# Patient Record
Sex: FEMALE | Race: WHITE | NOT HISPANIC OR LATINO | Employment: STUDENT | URBAN - METROPOLITAN AREA
[De-identification: names, ages, dates, MRNs, and addresses within clinical notes are randomized per-mention and may not be internally consistent; named-entity substitution may affect disease eponyms.]

---
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
## 2025-01-08 ENCOUNTER — APPOINTMENT (OUTPATIENT)
Dept: PRIMARY CARE | Facility: CLINIC | Age: 20
End: 2025-01-08
Payer: COMMERCIAL

## 2025-01-08 VITALS
HEART RATE: 60 BPM | WEIGHT: 168 LBS | HEIGHT: 66 IN | BODY MASS INDEX: 27 KG/M2 | SYSTOLIC BLOOD PRESSURE: 108 MMHG | DIASTOLIC BLOOD PRESSURE: 61 MMHG | OXYGEN SATURATION: 98 %

## 2025-01-08 DIAGNOSIS — G25.2 INTENTION TREMOR: ICD-10-CM

## 2025-01-08 DIAGNOSIS — R63.1 EXCESSIVE THIRST: ICD-10-CM

## 2025-01-08 DIAGNOSIS — R51.9 NONINTRACTABLE HEADACHE, UNSPECIFIED CHRONICITY PATTERN, UNSPECIFIED HEADACHE TYPE: ICD-10-CM

## 2025-01-08 DIAGNOSIS — M62.89 MUSCLE FATIGUE: Primary | ICD-10-CM

## 2025-01-08 DIAGNOSIS — M79.10 MYALGIA: ICD-10-CM

## 2025-01-08 PROCEDURE — 3008F BODY MASS INDEX DOCD: CPT | Performed by: FAMILY MEDICINE

## 2025-01-08 PROCEDURE — 99214 OFFICE O/P EST MOD 30 MIN: CPT | Performed by: FAMILY MEDICINE

## 2025-01-08 RX ORDER — QUETIAPINE FUMARATE 50 MG/1
50 TABLET, FILM COATED ORAL NIGHTLY
COMMUNITY

## 2025-01-08 NOTE — PROGRESS NOTES
"Subjective     Patient ID: [id number]     Teodoro Almaguer is a 19 y.o. female who presents for Dehydration and Ankle Pain.  Grandfather is present to provide some history.    HPI  Feels need to drink more water/fluids.  States her mouth feels dry.  Also feels very physically weak.  Very little activity because easily fatigued.  Feels that she needs to use a cane and frequently to sit/rest.  States this has been her whole life  Chronic constant pain in arms, legs and has headaches daily.  Has constant finger \"shakiness\" - bilateral.      9/2024 labs normal    Anxiety disorder treated by psychiatrist and patient feels it is well controlled      Objective   /61   Pulse 60   Ht 1.676 m (5' 6\")   Wt 76.2 kg (168 lb)   SpO2 98%   BMI 27.12 kg/m²    Physical Exam:   Alert and oriented x 3.  No acute distress.  Clear speech.  All major joints with full range of motion.  Normal muscle strength.  Normal deep tendon reflexes.  Normal hand grasp.      Assessment/Plan   1. Muscle fatigue (Primary)  - Referral to Neurology; Future    2. Excessive thirst  - Referral to Neurology; Future    3. Myalgia  - Referral to Neurology; Future    4. Intention tremor    5. Nonintractable headache, unspecified chronicity pattern, unspecified headache type  - Referral to Neurology; Future    I suspect all symptoms are due to psychiatric issues.  September 2024 labs did not indicate any etiology.  Will refer to neurologist to rule out neurologic conditions.  If neurology assessment is negative, recommended to patient and her grandfather that she seek further psychiatric help.  I am unable to see psychiatrist notes.  I do not know what conditions the psychiatrist is treating the patient for.    Problem List Items Addressed This Visit    None      Arsenio Jeter, DO   "

## 2025-02-06 ENCOUNTER — APPOINTMENT (OUTPATIENT)
Dept: OBSTETRICS AND GYNECOLOGY | Facility: CLINIC | Age: 20
End: 2025-02-06
Payer: COMMERCIAL

## 2025-03-12 ENCOUNTER — APPOINTMENT (OUTPATIENT)
Facility: CLINIC | Age: 20
End: 2025-03-12
Payer: COMMERCIAL

## 2025-03-13 ENCOUNTER — OFFICE VISIT (OUTPATIENT)
Facility: CLINIC | Age: 20
End: 2025-03-13
Payer: COMMERCIAL

## 2025-03-13 VITALS
DIASTOLIC BLOOD PRESSURE: 62 MMHG | BODY MASS INDEX: 26.9 KG/M2 | HEIGHT: 66 IN | SYSTOLIC BLOOD PRESSURE: 102 MMHG | WEIGHT: 167.4 LBS

## 2025-03-13 DIAGNOSIS — F84.0 AUTISM SPECTRUM DISORDER (HHS-HCC): ICD-10-CM

## 2025-03-13 DIAGNOSIS — J45.909 UNCOMPLICATED ASTHMA, UNSPECIFIED ASTHMA SEVERITY, UNSPECIFIED WHETHER PERSISTENT (HHS-HCC): ICD-10-CM

## 2025-03-13 DIAGNOSIS — Q52.4 HYMEN ABNORMALITY: Primary | ICD-10-CM

## 2025-03-13 PROBLEM — Z00.00 ENCOUNTER FOR HEALTH MAINTENANCE EXAMINATION: Status: RESOLVED | Noted: 2024-08-06 | Resolved: 2025-03-13

## 2025-03-13 PROCEDURE — 3008F BODY MASS INDEX DOCD: CPT | Performed by: OBSTETRICS & GYNECOLOGY

## 2025-03-13 PROCEDURE — 99213 OFFICE O/P EST LOW 20 MIN: CPT | Performed by: OBSTETRICS & GYNECOLOGY

## 2025-03-13 RX ORDER — ACETAMINOPHEN 325 MG/1
975 TABLET ORAL ONCE
Status: CANCELLED | OUTPATIENT
Start: 2025-03-13 | End: 2025-03-13

## 2025-03-13 RX ORDER — GABAPENTIN 600 MG/1
600 TABLET ORAL ONCE
Status: CANCELLED | OUTPATIENT
Start: 2025-03-13 | End: 2025-03-13

## 2025-03-13 RX ORDER — CELECOXIB 400 MG/1
400 CAPSULE ORAL ONCE
Status: CANCELLED | OUTPATIENT
Start: 2025-03-13 | End: 2025-03-13

## 2025-03-13 NOTE — H&P (VIEW-ONLY)
"Subjective   Patient ID: Teodoro Almaguer is a 20 y.o. female who presents for Follow-up. Was seen 24 for problems with her hymen and inserting a tampon.  HPI   not sexually active . Partner is trans male in [place]   Has noticed a hymenal band \" since I was 13\" . Recently using tampons for menses and feels this band getting in the way .       Review of Systems  Neg   Objective   Physical Exam  Physical Exam  Constitutional:       Appearance: Normal appearance.     Abdominal:    Abdomen is flat. Soft with no masses, non tender       Genitourinary:     Labia:  No lesions  and No rash.       Urethra: No urethral lesion.      Bladder: no prolapse     Vagina: Normal mucosa, pink and no discharge. Hymen : small thin mucosal band 1 cm wide by 3 cm long  dividing the hymen in two .      Cervix: Normal. Small w no lesions      Uterus:    Small. Non tender. No masses.      Adnexa:  Bilaterally with no mass or tenderness.                  Assessment/Plan   Diagnoses and all orders for this visit:  Hymen abnormality  Autism    Pt declines office procedure for removal of hymenal band. Wants this under anesthesia.  Her legal guardian is her Grandfather Frank Almaguer [phone] . He is here with her and we discussed pre and post op care. Surgery to be outpatient.    Will schedule .     MD Nora Alexis, [facility] 25 1:55 PM     I agree with this note. This patient was seen and examined by me. today Additions/ corrections made in text by me.     Cecilia Eng MD    "

## 2025-03-13 NOTE — PROGRESS NOTES
"Subjective   Patient ID: Teodoro Almaguer is a 20 y.o. female who presents for Follow-up. Was seen 24 for problems with her hymen and inserting a tampon.  HPI   not sexually active . Partner is trans male in [place]   Has noticed a hymenal band \" since I was 13\" . Recently using tampons for menses and feels this band getting in the way .       Review of Systems  Neg   Objective   Physical Exam  Physical Exam  Constitutional:       Appearance: Normal appearance.     Abdominal:    Abdomen is flat. Soft with no masses, non tender       Genitourinary:     Labia:  No lesions  and No rash.       Urethra: No urethral lesion.      Bladder: no prolapse     Vagina: Normal mucosa, pink and no discharge. Hymen : small thin mucosal band 1 cm wide by 3 cm long  dividing the hymen in two .      Cervix: Normal. Small w no lesions      Uterus:    Small. Non tender. No masses.      Adnexa:  Bilaterally with no mass or tenderness.                  Assessment/Plan   Diagnoses and all orders for this visit:  Hymen abnormality  Autism    Pt declines office procedure for removal of hymenal band. Wants this under anesthesia.  Her legal guardian is her Grandfather Frank Almaguer [phone] . He is here with her and we discussed pre and post op care. Surgery to be outpatient.    Will schedule .     MD Nora Alexis, [facility] 25 1:55 PM   "

## 2025-03-21 ENCOUNTER — OFFICE VISIT (OUTPATIENT)
Dept: PRIMARY CARE | Facility: CLINIC | Age: 20
End: 2025-03-21
Payer: COMMERCIAL

## 2025-03-21 VITALS
SYSTOLIC BLOOD PRESSURE: 106 MMHG | OXYGEN SATURATION: 98 % | HEIGHT: 66 IN | BODY MASS INDEX: 27.16 KG/M2 | WEIGHT: 169 LBS | DIASTOLIC BLOOD PRESSURE: 66 MMHG | HEART RATE: 93 BPM

## 2025-03-21 DIAGNOSIS — F31.62 BIPOLAR DISORDER, CURRENT EPISODE MIXED, MODERATE (MULTI): ICD-10-CM

## 2025-03-21 DIAGNOSIS — F84.0 AUTISM SPECTRUM DISORDER (HHS-HCC): ICD-10-CM

## 2025-03-21 DIAGNOSIS — Q52.4 HYMEN ABNORMALITY: Primary | ICD-10-CM

## 2025-03-21 DIAGNOSIS — Z01.818 PREOP EXAMINATION: ICD-10-CM

## 2025-03-21 PROCEDURE — 3008F BODY MASS INDEX DOCD: CPT | Performed by: FAMILY MEDICINE

## 2025-03-21 PROCEDURE — 99214 OFFICE O/P EST MOD 30 MIN: CPT | Performed by: FAMILY MEDICINE

## 2025-03-21 PROCEDURE — 1036F TOBACCO NON-USER: CPT | Performed by: FAMILY MEDICINE

## 2025-03-21 NOTE — PROGRESS NOTES
Medical Pre Operative Evaluation    Date of Surgery:  4/8/25  Procedure:  EXCISION, LESION, VAGINA [14992 (CPT®)]   Surgeon:  Cecilia Eng MD   Hospital:  [facility]  Anesthesia:  not specified      HPI  Diagnosis   Hymen abnormality   Evaluated by Dr. Eng.  Currently denies URI symptoms, abdominal pain, nausea, vomiting or diarrhea.  Remote history of asthma.  Currently not being treated because she is asymptomatic.  Father is present for today's evaluation because patient does not drive due to autism spectrum disorder.  She is being treated for bipolar disorder by a psychiatrist.          Past Medical History:   Diagnosis Date    Anxiety     Anxiety disorder 08/06/2024    Autism spectrum disorder ([facility]) 03/13/2025    Bipolar disorder, current episode mixed, moderate (Multi) 03/21/2025    Influenza due to other identified influenza virus with other respiratory manifestations 02/25/2020    Type B influenza    Pica, in children 08/02/2016    History of pica    Scabies 06/18/2018    History of pediculosis       Past Surgical History:   Procedure Laterality Date    OTHER SURGICAL HISTORY  11/04/2019    No history of surgery       Social History     Socioeconomic History    Marital status: Single     Spouse name: Not on file    Number of children: Not on file    Years of education: Not on file    Highest education level: Not on file   Occupational History    Not on file   Tobacco Use    Smoking status: Never    Smokeless tobacco: Never   Vaping Use    Vaping status: Never Used   Substance and Sexual Activity    Alcohol use: Never    Drug use: Never    Sexual activity: Not Currently   Other Topics Concern    Not on file   Social History Narrative    Not on file     Social Drivers of Health     Financial Resource Strain: Not on file   Food Insecurity: Not on file   Transportation Needs: Not on file   Physical Activity: Not on file   Stress: Not on file   Social Connections: Not on file  "  Intimate Partner Violence: Not on file   Housing Stability: Not on file       Current Outpatient Medications on File Prior to Visit   Medication Sig Dispense Refill    cetirizine (ZyrTEC) 10 mg tablet Take 1 tablet (10 mg) by mouth early in the morning..      FLUoxetine (PROzac) 40 mg capsule Take 1 capsule (40 mg) by mouth once daily.      QUEtiapine (SEROquel) 50 mg tablet Take 1 tablet (50 mg) by mouth once daily at bedtime.       No current facility-administered medications on file prior to visit.       Allergies   Allergen Reactions    Milk Hives, Dermatitis and Nausea/vomiting     Confirmed with testing       Visit Vitals  /66   Pulse 93   Ht 1.676 m (5' 6\")   Wt 76.7 kg (169 lb)   LMP  (LMP Unknown)   SpO2 98%   BMI 27.28 kg/m²   OB Status Having periods   Smoking Status Never   BSA 1.89 m²       Alert and oriented x3.  Patient can correctly identify day of week, month, year, location.  She is a fairly high functioning autistic.  Eyes: EOM grossly intact  Neck supple without lymph adenopathy or carotid bruit.  No masses or thyromegaly  Heart regular rate and rhythm without murmur.  Lungs clear to auscultation.  Legs without edema.  Gait is non-antalgic  Speech clear.  Hearing adequate.    ASSESSMENT / DIAGNOSIS  Problem List Items Addressed This Visit       Autism spectrum disorder ([facility])    Hymen abnormality - Primary    Bipolar disorder, current episode mixed, moderate (Multi)         PLAN  Patient is medically stable and an acceptable risk for the proposed procedure pending review of pre-op lab.    All over the counter NSAIDS (aspirin, Bufferin, Excedrin, ibuprofen, Motrin, Advil, naproxsyn, Aleve, etc) and any prescription NSAIDS should be stopped 7 days prior to surgery unless instructed differently by the surgeon.        Follow up in my office as scheduled for routine medical exam    Arsenio Jeter DO, FACOFP   "

## 2025-03-22 LAB
BASOPHILS # BLD AUTO: 50 CELLS/UL (ref 0–200)
BASOPHILS NFR BLD AUTO: 0.9 %
EOSINOPHIL # BLD AUTO: 319 CELLS/UL (ref 15–500)
EOSINOPHIL NFR BLD AUTO: 5.8 %
ERYTHROCYTE [DISTWIDTH] IN BLOOD BY AUTOMATED COUNT: 13 % (ref 11–15)
HCT VFR BLD AUTO: 37.7 % (ref 35–45)
HGB BLD-MCNC: 12 G/DL (ref 11.7–15.5)
LYMPHOCYTES # BLD AUTO: 2316 CELLS/UL (ref 850–3900)
LYMPHOCYTES NFR BLD AUTO: 42.1 %
MCH RBC QN AUTO: 28 PG (ref 27–33)
MCHC RBC AUTO-ENTMCNC: 31.8 G/DL (ref 32–36)
MCV RBC AUTO: 87.9 FL (ref 80–100)
MONOCYTES # BLD AUTO: 473 CELLS/UL (ref 200–950)
MONOCYTES NFR BLD AUTO: 8.6 %
NEUTROPHILS # BLD AUTO: 2343 CELLS/UL (ref 1500–7800)
NEUTROPHILS NFR BLD AUTO: 42.6 %
PLATELET # BLD AUTO: 219 THOUSAND/UL (ref 140–400)
PMV BLD REES-ECKER: 11.8 FL (ref 7.5–12.5)
RBC # BLD AUTO: 4.29 MILLION/UL (ref 3.8–5.1)
WBC # BLD AUTO: 5.5 THOUSAND/UL (ref 3.8–10.8)

## 2025-04-08 ENCOUNTER — ANESTHESIA EVENT (OUTPATIENT)
Dept: OPERATING ROOM | Facility: HOSPITAL | Age: 20
End: 2025-04-08
Payer: COMMERCIAL

## 2025-04-08 ENCOUNTER — HOSPITAL ENCOUNTER (OUTPATIENT)
Facility: HOSPITAL | Age: 20
Setting detail: OUTPATIENT SURGERY
Discharge: HOME | End: 2025-04-08
Attending: OBSTETRICS & GYNECOLOGY | Admitting: OBSTETRICS & GYNECOLOGY
Payer: COMMERCIAL

## 2025-04-08 ENCOUNTER — ANESTHESIA (OUTPATIENT)
Dept: OPERATING ROOM | Facility: HOSPITAL | Age: 20
End: 2025-04-08
Payer: COMMERCIAL

## 2025-04-08 VITALS
WEIGHT: 169 LBS | DIASTOLIC BLOOD PRESSURE: 58 MMHG | HEIGHT: 67 IN | OXYGEN SATURATION: 96 % | SYSTOLIC BLOOD PRESSURE: 101 MMHG | RESPIRATION RATE: 16 BRPM | TEMPERATURE: 97.3 F | HEART RATE: 103 BPM | BODY MASS INDEX: 26.53 KG/M2

## 2025-04-08 DIAGNOSIS — Q52.4 HYMEN ABNORMALITY: Primary | ICD-10-CM

## 2025-04-08 DIAGNOSIS — G89.18 POST-OP PAIN: ICD-10-CM

## 2025-04-08 LAB — HCG UR QL IA.RAPID: NEGATIVE

## 2025-04-08 PROCEDURE — 56700 PRTL HYMNCTMY/REVJ HYMNL RNG: CPT | Performed by: OBSTETRICS & GYNECOLOGY

## 2025-04-08 PROCEDURE — 3600000003 HC OR TIME - INITIAL BASE CHARGE - PROCEDURE LEVEL THREE: Performed by: OBSTETRICS & GYNECOLOGY

## 2025-04-08 PROCEDURE — 7100000009 HC PHASE TWO TIME - INITIAL BASE CHARGE: Performed by: OBSTETRICS & GYNECOLOGY

## 2025-04-08 PROCEDURE — 2500000005 HC RX 250 GENERAL PHARMACY W/O HCPCS: Performed by: STUDENT IN AN ORGANIZED HEALTH CARE EDUCATION/TRAINING PROGRAM

## 2025-04-08 PROCEDURE — 2500000004 HC RX 250 GENERAL PHARMACY W/ HCPCS (ALT 636 FOR OP/ED): Performed by: NURSE ANESTHETIST, CERTIFIED REGISTERED

## 2025-04-08 PROCEDURE — 7100000010 HC PHASE TWO TIME - EACH INCREMENTAL 1 MINUTE: Performed by: OBSTETRICS & GYNECOLOGY

## 2025-04-08 PROCEDURE — 7100000002 HC RECOVERY ROOM TIME - EACH INCREMENTAL 1 MINUTE: Performed by: OBSTETRICS & GYNECOLOGY

## 2025-04-08 PROCEDURE — 3700000002 HC GENERAL ANESTHESIA TIME - EACH INCREMENTAL 1 MINUTE: Performed by: OBSTETRICS & GYNECOLOGY

## 2025-04-08 PROCEDURE — 3700000001 HC GENERAL ANESTHESIA TIME - INITIAL BASE CHARGE: Performed by: OBSTETRICS & GYNECOLOGY

## 2025-04-08 PROCEDURE — 3600000008 HC OR TIME - EACH INCREMENTAL 1 MINUTE - PROCEDURE LEVEL THREE: Performed by: OBSTETRICS & GYNECOLOGY

## 2025-04-08 PROCEDURE — 7100000001 HC RECOVERY ROOM TIME - INITIAL BASE CHARGE: Performed by: OBSTETRICS & GYNECOLOGY

## 2025-04-08 PROCEDURE — 81025 URINE PREGNANCY TEST: CPT | Performed by: OBSTETRICS & GYNECOLOGY

## 2025-04-08 PROCEDURE — 2500000004 HC RX 250 GENERAL PHARMACY W/ HCPCS (ALT 636 FOR OP/ED): Performed by: OBSTETRICS & GYNECOLOGY

## 2025-04-08 PROCEDURE — 2500000005 HC RX 250 GENERAL PHARMACY W/O HCPCS

## 2025-04-08 PROCEDURE — 2500000004 HC RX 250 GENERAL PHARMACY W/ HCPCS (ALT 636 FOR OP/ED)

## 2025-04-08 PROCEDURE — 2500000005 HC RX 250 GENERAL PHARMACY W/O HCPCS: Performed by: OBSTETRICS & GYNECOLOGY

## 2025-04-08 RX ORDER — ONDANSETRON HYDROCHLORIDE 2 MG/ML
4 INJECTION, SOLUTION INTRAVENOUS ONCE AS NEEDED
Status: DISCONTINUED | OUTPATIENT
Start: 2025-04-08 | End: 2025-04-08 | Stop reason: HOSPADM

## 2025-04-08 RX ORDER — ALBUTEROL SULFATE 0.83 MG/ML
2.5 SOLUTION RESPIRATORY (INHALATION) ONCE AS NEEDED
Status: DISCONTINUED | OUTPATIENT
Start: 2025-04-08 | End: 2025-04-08 | Stop reason: HOSPADM

## 2025-04-08 RX ORDER — ONDANSETRON HYDROCHLORIDE 2 MG/ML
INJECTION, SOLUTION INTRAVENOUS AS NEEDED
Status: DISCONTINUED | OUTPATIENT
Start: 2025-04-08 | End: 2025-04-08

## 2025-04-08 RX ORDER — FENTANYL CITRATE 50 UG/ML
INJECTION, SOLUTION INTRAMUSCULAR; INTRAVENOUS AS NEEDED
Status: DISCONTINUED | OUTPATIENT
Start: 2025-04-08 | End: 2025-04-08

## 2025-04-08 RX ORDER — OXYCODONE HYDROCHLORIDE 5 MG/1
5 TABLET ORAL EVERY 4 HOURS PRN
Status: DISCONTINUED | OUTPATIENT
Start: 2025-04-08 | End: 2025-04-08 | Stop reason: HOSPADM

## 2025-04-08 RX ORDER — HYDROMORPHONE HYDROCHLORIDE 0.2 MG/ML
0.2 INJECTION INTRAMUSCULAR; INTRAVENOUS; SUBCUTANEOUS
Status: DISCONTINUED | OUTPATIENT
Start: 2025-04-08 | End: 2025-04-08 | Stop reason: HOSPADM

## 2025-04-08 RX ORDER — SODIUM CHLORIDE, SODIUM LACTATE, POTASSIUM CHLORIDE, CALCIUM CHLORIDE 600; 310; 30; 20 MG/100ML; MG/100ML; MG/100ML; MG/100ML
100 INJECTION, SOLUTION INTRAVENOUS CONTINUOUS
Status: ACTIVE | OUTPATIENT
Start: 2025-04-08 | End: 2025-04-08

## 2025-04-08 RX ORDER — SODIUM CHLORIDE, SODIUM LACTATE, POTASSIUM CHLORIDE, CALCIUM CHLORIDE 600; 310; 30; 20 MG/100ML; MG/100ML; MG/100ML; MG/100ML
INJECTION, SOLUTION INTRAVENOUS CONTINUOUS PRN
Status: DISCONTINUED | OUTPATIENT
Start: 2025-04-08 | End: 2025-04-08

## 2025-04-08 RX ORDER — IBUPROFEN 600 MG/1
600 TABLET ORAL EVERY 6 HOURS PRN
Qty: 10 TABLET | Refills: 1 | Status: SHIPPED | OUTPATIENT
Start: 2025-04-08

## 2025-04-08 RX ORDER — FENTANYL CITRATE 50 UG/ML
25 INJECTION, SOLUTION INTRAMUSCULAR; INTRAVENOUS EVERY 5 MIN PRN
Status: DISCONTINUED | OUTPATIENT
Start: 2025-04-08 | End: 2025-04-08 | Stop reason: HOSPADM

## 2025-04-08 RX ORDER — LABETALOL HYDROCHLORIDE 5 MG/ML
5 INJECTION, SOLUTION INTRAVENOUS ONCE AS NEEDED
Status: DISCONTINUED | OUTPATIENT
Start: 2025-04-08 | End: 2025-04-08 | Stop reason: HOSPADM

## 2025-04-08 RX ORDER — PROPOFOL 10 MG/ML
INJECTION, EMULSION INTRAVENOUS AS NEEDED
Status: DISCONTINUED | OUTPATIENT
Start: 2025-04-08 | End: 2025-04-08

## 2025-04-08 RX ORDER — MEPERIDINE HYDROCHLORIDE 50 MG/ML
12.5 INJECTION INTRAMUSCULAR; INTRAVENOUS; SUBCUTANEOUS EVERY 10 MIN PRN
Status: DISCONTINUED | OUTPATIENT
Start: 2025-04-08 | End: 2025-04-08 | Stop reason: HOSPADM

## 2025-04-08 RX ORDER — PHENYLEPHRINE HCL IN 0.9% NACL 1 MG/10 ML
SYRINGE (ML) INTRAVENOUS AS NEEDED
Status: DISCONTINUED | OUTPATIENT
Start: 2025-04-08 | End: 2025-04-08

## 2025-04-08 RX ORDER — LIDOCAINE HYDROCHLORIDE 20 MG/ML
INJECTION, SOLUTION EPIDURAL; INFILTRATION; INTRACAUDAL; PERINEURAL AS NEEDED
Status: DISCONTINUED | OUTPATIENT
Start: 2025-04-08 | End: 2025-04-08

## 2025-04-08 RX ORDER — MIDAZOLAM HYDROCHLORIDE 1 MG/ML
INJECTION, SOLUTION INTRAMUSCULAR; INTRAVENOUS AS NEEDED
Status: DISCONTINUED | OUTPATIENT
Start: 2025-04-08 | End: 2025-04-08

## 2025-04-08 RX ORDER — MIDAZOLAM HYDROCHLORIDE 1 MG/ML
1 INJECTION, SOLUTION INTRAMUSCULAR; INTRAVENOUS ONCE AS NEEDED
Status: DISCONTINUED | OUTPATIENT
Start: 2025-04-08 | End: 2025-04-08 | Stop reason: HOSPADM

## 2025-04-08 RX ADMIN — MIDAZOLAM 2 MG: 1 INJECTION INTRAMUSCULAR; INTRAVENOUS at 10:48

## 2025-04-08 RX ADMIN — Medication 150 MCG: at 11:14

## 2025-04-08 RX ADMIN — Medication 200 MCG: at 11:02

## 2025-04-08 RX ADMIN — PROPOFOL 150 MG: 10 INJECTION, EMULSION INTRAVENOUS at 10:54

## 2025-04-08 RX ADMIN — FENTANYL CITRATE 50 MCG: 50 INJECTION, SOLUTION INTRAMUSCULAR; INTRAVENOUS at 10:59

## 2025-04-08 RX ADMIN — LIDOCAINE HYDROCHLORIDE 100 MG: 20 INJECTION, SOLUTION EPIDURAL; INFILTRATION; INTRACAUDAL; PERINEURAL at 10:54

## 2025-04-08 RX ADMIN — SODIUM CHLORIDE, POTASSIUM CHLORIDE, SODIUM LACTATE AND CALCIUM CHLORIDE: 600; 310; 30; 20 INJECTION, SOLUTION INTRAVENOUS at 10:48

## 2025-04-08 RX ADMIN — WHITE PETROLATUM 57.7 %-MINERAL OIL 31.9 % EYE OINTMENT 1 APPLICATION: at 11:12

## 2025-04-08 RX ADMIN — Medication 100 MCG: at 11:18

## 2025-04-08 RX ADMIN — Medication 6 L/MIN: at 11:34

## 2025-04-08 RX ADMIN — Medication 100 MCG: at 11:00

## 2025-04-08 RX ADMIN — ONDANSETRON 4 MG: 2 INJECTION INTRAMUSCULAR; INTRAVENOUS at 11:08

## 2025-04-08 RX ADMIN — DEXAMETHASONE SODIUM PHOSPHATE 8 MG: 4 INJECTION INTRA-ARTICULAR; INTRALESIONAL; INTRAMUSCULAR; INTRAVENOUS; SOFT TISSUE at 11:06

## 2025-04-08 RX ADMIN — FENTANYL CITRATE 50 MCG: 50 INJECTION, SOLUTION INTRAMUSCULAR; INTRAVENOUS at 10:54

## 2025-04-08 RX ADMIN — PROPOFOL 50 MG: 10 INJECTION, EMULSION INTRAVENOUS at 10:56

## 2025-04-08 SDOH — HEALTH STABILITY: MENTAL HEALTH: CURRENT SMOKER: 0

## 2025-04-08 ASSESSMENT — PAIN SCALES - GENERAL
PAINLEVEL_OUTOF10: 1
PAINLEVEL_OUTOF10: 0 - NO PAIN
PAINLEVEL_OUTOF10: 1
PAINLEVEL_OUTOF10: 0 - NO PAIN
PAIN_LEVEL: 0
PAINLEVEL_OUTOF10: 0 - NO PAIN
PAINLEVEL_OUTOF10: 1
PAINLEVEL_OUTOF10: 1

## 2025-04-08 ASSESSMENT — PAIN - FUNCTIONAL ASSESSMENT
PAIN_FUNCTIONAL_ASSESSMENT: 0-10
PAIN_FUNCTIONAL_ASSESSMENT: UNABLE TO SELF-REPORT
PAIN_FUNCTIONAL_ASSESSMENT: 0-10
PAIN_FUNCTIONAL_ASSESSMENT: UNABLE TO SELF-REPORT

## 2025-04-08 NOTE — ANESTHESIA POSTPROCEDURE EVALUATION
Patient: Teodoro Almaguer    Procedure Summary       Date: 04/08/25 Room / Location: [facility] OR  / Virtual [facility] OR    Anesthesia Start: 1049 Anesthesia Stop: 1136    Procedure: EXCISION, LESION, VAGINA Diagnosis:       Hymen abnormality      (Hymen abnormality [Q52.4])    Surgeons: Cecilia Eng MD Responsible Provider: Ezio Cox DO    Anesthesia Type: general ASA Status: Not recorded            Anesthesia Type: general    Vitals Value Taken Time   BP 91/52 04/08/25 1134   Temp 36 04/08/25 1138   Pulse 64 04/08/25 1137   Resp 17 04/08/25 1137   SpO2 97 % 04/08/25 1137   Vitals shown include unfiled device data.    Anesthesia Post Evaluation    Patient location during evaluation: bedside  Patient participation: complete - patient participated  Level of consciousness: awake  Pain score: 0  Pain management: adequate  Airway patency: patent  Cardiovascular status: acceptable  Respiratory status: acceptable and face mask  Hydration status: acceptable  Postoperative Nausea and Vomiting: none        There were no known notable events for this encounter.

## 2025-04-08 NOTE — ANESTHESIA PREPROCEDURE EVALUATION
Patient: Teodoro Almaguer    Procedure Information       Anesthesia Start Date/Time: 04/08/25 1049    Procedure: EXCISION, LESION, VAGINA - 30 min procedure or less    Location: [facility] OR 10 / [facility] OR    Surgeons: Cecilia Eng MD            Relevant Problems   Pulmonary   (+) Asthma, chronic, mild persistent, uncomplicated ([facility])      Neuro   (+) Anxiety disorder   (+) Autism spectrum disorder ([facility])   (+) Bipolar disorder, current episode mixed, moderate (Multi)       Clinical information reviewed:   Tobacco  Allergies  Meds   Med Hx  Surg Hx   Fam Hx  Soc Hx        NPO Detail:  NPO/Void Status  Date of Last Liquid: 04/07/25  Time of Last Liquid: 2355  Date of Last Solid: 04/07/25  Time of Last Solid: 2330  Time of Last Void: 1040         Physical Exam    Airway  Mallampati: I  TM distance: >3 FB     Cardiovascular - normal exam  Rhythm: regular  Rate: normal     Dental - normal exam     Pulmonary - normal exam     Abdominal - normal exam  Abdomen: soft             Anesthesia Plan    History of general anesthesia?: yes  History of complications of general anesthesia?: no    ASA 1     general     The patient is not a current smoker.  Patient was previously instructed to abstain from smoking on day of procedure.  Patient did not smoke on day of procedure.  Education provided regarding risk of obstructive sleep apnea.  intravenous induction   Postoperative administration of opioids is intended.  Anesthetic plan and risks discussed with patient.  Use of blood products discussed with patient who.

## 2025-04-08 NOTE — ANESTHESIA PROCEDURE NOTES
Airway  Date/Time: 4/8/2025 10:57 AM  Urgency: elective      Staffing  Performed: EVAN   Authorized by: Ezio Cox DO    Performed by: Matthieu Meade RN  Patient location during procedure: OR    Indications and Patient Condition  Indications for airway management: anesthesia  Spontaneous Ventilation: absent  Sedation level: deep  Preoxygenated: yes  Patient position: sniffing  MILS maintained throughout  Mask difficulty assessment: 1 - vent by mask    Final Airway Details  Final airway type: supraglottic airway      Successful airway: classic  Size 4     Number of attempts at approach: 1  Ventilation between attempts: BVM

## 2025-04-09 NOTE — OP NOTE
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.4] Post-op Diagnosis     * Hymen abnormality [Q52.4]     Procedures  EXCISION, LESION, VAGINA/ HYMENAL BAND       Surgeons      * Cecilia Eng - Primary    Resident/Fellow/Other Assistant:    Staff:   Carisaulator: Tanna Hemphill Person: Anita Hemphill Person: Eli  Surgical Assistant: Heather    Anesthesia Staff: Anesthesiologist: Ezio Cox DO  CRNA: Caitlyn Fuentes APRN-CRNA  SRNA: Matthieu Meade RN    Procedure Summary  Anesthesia: General  ASA: I  Estimated Blood Loss: 10 mL  Intra-op Medications:   Administrations occurring from 1120 to 1340 on 25:   Medication Name Total Dose   gelatin sponge,absorb-porcine (Gelfoam) sponge 1 each   LR infusion Cannot be calculated   oxygen (O2) therapy 246 L              Anesthesia Record               Intraprocedure I/O Totals          Intake    LR infusion 400.00 mL    Total Intake 400 mL       Output    Est. Blood Loss 0 mL    Total Output 0 mL       Net    Net Volume 400 mL          Specimen: No specimens collected        Procedure Details:  The patient was seen in the preoperative area. The site of surgery was properly noted/marked if necessary per policy. The patient has been actively warmed in preoperative area. Preoperative antibiotics are not indicated. Venous thrombosis prophylaxis have been ordered including bilateral sequential compression devices    Findings: 2 cm long hymenal band from 11:00 to 6;00 of vagina       This is a 20-year-old female  who was seen in the office with difficulty wearing tampons during her menstrual period due to a vaginal band of tissue.  This was relatively thick and would require suturing with removal and patient chose to do this under general.  Anesthesia.  After general anesthesia was administered she was placed in dorsal lithotomy  position.  The vaginal area was prepped with chlorhexidine.    The band of tissue was injected with 1% lidocaine with epinephrine approximately 4 cc total.  Hemostats were used to isolate the band and excise at both ends.  The pedicles were sutured with 3-0 Vicryl suture.  At the end of the procedure the vagina was inspected and appeared normal.  There was a little bit of oozing that occurred between the sutures.  And a small Gelfoam square was inserted in the lower vagina.    The patient was awakened from her general anesthesia sponge lap needle and instrument counts were correct and she was brought to recovery room in stable condition          Complications:  None; patient tolerated the procedure well.     Disposition: PACU - hemodynamically stable.  Condition: stable    Cecilia Eng MD

## 2025-04-23 ENCOUNTER — APPOINTMENT (OUTPATIENT)
Facility: CLINIC | Age: 20
End: 2025-04-23
Payer: COMMERCIAL

## 2025-04-23 VITALS
DIASTOLIC BLOOD PRESSURE: 70 MMHG | HEIGHT: 67 IN | BODY MASS INDEX: 26.46 KG/M2 | SYSTOLIC BLOOD PRESSURE: 105 MMHG | WEIGHT: 168.6 LBS

## 2025-04-23 DIAGNOSIS — Z09 POSTOP CHECK: Primary | ICD-10-CM

## 2025-04-23 PROCEDURE — 99213 OFFICE O/P EST LOW 20 MIN: CPT | Performed by: OBSTETRICS & GYNECOLOGY

## 2025-04-23 PROCEDURE — 3008F BODY MASS INDEX DOCD: CPT | Performed by: OBSTETRICS & GYNECOLOGY

## 2025-04-23 NOTE — PROGRESS NOTES
Subjective   Patient ID: Teodoro Almaguer is a 20 y.o. female who presents for Post-op. 4/8/25 Hymenal band excision.     HPI  Doing well post op. Minimal pain . On her menses now.   Wears pads/ disposable underwear for menses   Review of Systems  Neg   Objective   Physical Exam  Hymenal band excised. No definite sutures seen.   Unable to tolerate small speculum exam due to pain.    Single digit exam palpates no band , no mass   Assessment/Plan   Diagnoses and all orders for this visit:  Postop check     Pt advised she can try tampons now. Call or return w any issues.    MD Nora Alexis, JOHN 04/23/25 8:25 AM

## 2025-06-26 ENCOUNTER — OFFICE VISIT (OUTPATIENT)
Dept: NEUROLOGY | Facility: CLINIC | Age: 20
End: 2025-06-26
Payer: MEDICAID

## 2025-06-26 VITALS
DIASTOLIC BLOOD PRESSURE: 70 MMHG | BODY MASS INDEX: 27.62 KG/M2 | WEIGHT: 176 LBS | HEART RATE: 91 BPM | SYSTOLIC BLOOD PRESSURE: 99 MMHG | HEIGHT: 67 IN

## 2025-06-26 DIAGNOSIS — R26.9 ABNORMAL GAIT: ICD-10-CM

## 2025-06-26 DIAGNOSIS — R53.1 GENERALIZED WEAKNESS: Primary | ICD-10-CM

## 2025-06-26 DIAGNOSIS — M79.10 MYALGIA: ICD-10-CM

## 2025-06-26 PROCEDURE — 3008F BODY MASS INDEX DOCD: CPT | Performed by: NURSE PRACTITIONER

## 2025-06-26 PROCEDURE — 99204 OFFICE O/P NEW MOD 45 MIN: CPT | Performed by: NURSE PRACTITIONER

## 2025-06-26 PROCEDURE — 99214 OFFICE O/P EST MOD 30 MIN: CPT | Performed by: NURSE PRACTITIONER

## 2025-06-26 NOTE — PROGRESS NOTES
Patient being assessed today for initial evaluation of myalgia, gait disturbance, generalized weakness.  Patient is accompanied by her grandfather who provides information as well during this evaluation.  Patient reports that she was recently at her PCPs office for an annual physical and was informed that she needed to be evaluated by neurology because of the myalgia and generalized weakness.  They report that since they can remember patient has struggled with weakness throughout her body as well as aches throughout her body.  She does ambulate with a cane.  She states that with standing for too long periods of time or ambulating for too long of distances she feels like her knees become weak and will buckle.  Denies falls.  Suffers from fatigue and poor endurance.  Denies numbness and tingling.  Denies dexterity issues.  Muscle tone and bulk equal bilaterally.  Generalized weakness assessed in all 4 extremities at 3+.  Labs ordered to rule out any reversible cause, these include folate, B12, CRP, sed rate.  Would also like to get an MRI of the brain and cervical spine to rule out MS or other organic cause for her symptoms.  This is a complex case.  Follow-up will be pending the results of the imaging and lab tests.    This note was created with voice recognition software and was not corrected for typographical or grammatical errors

## 2025-07-15 ENCOUNTER — LAB (OUTPATIENT)
Dept: LAB | Facility: HOSPITAL | Age: 20
End: 2025-07-15
Payer: MEDICAID

## 2025-07-15 LAB
ERYTHROCYTE [DISTWIDTH] IN BLOOD BY AUTOMATED COUNT: 13.5 % (ref 11.5–14.5)
HCT VFR BLD AUTO: 41 % (ref 36–46)
HGB BLD-MCNC: 13.1 G/DL (ref 12–16)
MCH RBC QN AUTO: 28.2 PG (ref 26–34)
MCHC RBC AUTO-ENTMCNC: 32 G/DL (ref 32–36)
MCV RBC AUTO: 88 FL (ref 80–100)
NRBC BLD-RTO: 0 /100 WBCS (ref 0–0)
PLATELET # BLD AUTO: 224 X10*3/UL (ref 150–450)
RBC # BLD AUTO: 4.65 X10*6/UL (ref 4–5.2)
WBC # BLD AUTO: 5.1 X10*3/UL (ref 4.4–11.3)

## 2025-07-15 PROCEDURE — 85027 COMPLETE CBC AUTOMATED: CPT

## 2025-07-16 LAB
CRP SERPL-MCNC: <3 MG/L
ERYTHROCYTE [SEDIMENTATION RATE] IN BLOOD BY WESTERGREN METHOD: 2 MM/H
FOLATE SERPL-MCNC: >24 NG/ML
VIT B12 SERPL-MCNC: 778 PG/ML (ref 200–1100)

## 2025-07-18 ENCOUNTER — HOSPITAL ENCOUNTER (OUTPATIENT)
Dept: RADIOLOGY | Facility: CLINIC | Age: 20
Discharge: HOME | End: 2025-07-18
Payer: MEDICAID

## 2025-07-18 DIAGNOSIS — R26.9 ABNORMAL GAIT: ICD-10-CM

## 2025-07-18 DIAGNOSIS — R53.1 GENERALIZED WEAKNESS: ICD-10-CM

## 2025-07-18 PROCEDURE — 72156 MRI NECK SPINE W/O & W/DYE: CPT

## 2025-07-18 PROCEDURE — 2550000001 HC RX 255 CONTRASTS: Performed by: NURSE PRACTITIONER

## 2025-07-18 PROCEDURE — A9575 INJ GADOTERATE MEGLUMI 0.1ML: HCPCS | Performed by: NURSE PRACTITIONER

## 2025-07-18 PROCEDURE — 70553 MRI BRAIN STEM W/O & W/DYE: CPT

## 2025-07-18 RX ORDER — GADOTERATE MEGLUMINE 376.9 MG/ML
0.2 INJECTION INTRAVENOUS
Status: COMPLETED | OUTPATIENT
Start: 2025-07-18 | End: 2025-07-18

## 2025-07-18 RX ADMIN — GADOTERATE MEGLUMINE 16 ML: 376.9 INJECTION INTRAVENOUS at 09:19

## 2025-08-04 DIAGNOSIS — M79.10 MYALGIA: Primary | ICD-10-CM

## 2025-08-04 DIAGNOSIS — R53.1 GENERALIZED WEAKNESS: ICD-10-CM

## 2025-08-04 DIAGNOSIS — R26.9 ABNORMAL GAIT: ICD-10-CM

## 2025-08-11 ENCOUNTER — APPOINTMENT (OUTPATIENT)
Dept: PRIMARY CARE | Facility: CLINIC | Age: 20
End: 2025-08-11
Payer: COMMERCIAL

## 2025-08-11 VITALS
BODY MASS INDEX: 27.51 KG/M2 | HEIGHT: 66 IN | HEART RATE: 100 BPM | WEIGHT: 171.2 LBS | SYSTOLIC BLOOD PRESSURE: 132 MMHG | DIASTOLIC BLOOD PRESSURE: 76 MMHG | OXYGEN SATURATION: 99 %

## 2025-08-11 DIAGNOSIS — Z00.00 HEALTHCARE MAINTENANCE: Primary | ICD-10-CM

## 2025-08-11 DIAGNOSIS — F31.62 BIPOLAR DISORDER, CURRENT EPISODE MIXED, MODERATE (MULTI): ICD-10-CM

## 2025-08-11 DIAGNOSIS — F84.0 AUTISM SPECTRUM DISORDER (HHS-HCC): ICD-10-CM

## 2025-08-11 DIAGNOSIS — E55.9 VITAMIN D DEFICIENCY: ICD-10-CM

## 2025-08-11 PROCEDURE — 3008F BODY MASS INDEX DOCD: CPT | Performed by: FAMILY MEDICINE

## 2025-08-11 PROCEDURE — 1036F TOBACCO NON-USER: CPT | Performed by: FAMILY MEDICINE

## 2025-08-11 PROCEDURE — G8433 SCR FOR DEP NOT CPT DOC RSN: HCPCS | Performed by: FAMILY MEDICINE

## 2025-08-11 PROCEDURE — 99395 PREV VISIT EST AGE 18-39: CPT | Performed by: FAMILY MEDICINE

## 2025-08-11 ASSESSMENT — PATIENT HEALTH QUESTIONNAIRE - PHQ9
SUM OF ALL RESPONSES TO PHQ9 QUESTIONS 1 AND 2: 0
2. FEELING DOWN, DEPRESSED OR HOPELESS: NOT AT ALL
1. LITTLE INTEREST OR PLEASURE IN DOING THINGS: NOT AT ALL

## 2025-08-14 ENCOUNTER — APPOINTMENT (OUTPATIENT)
Facility: CLINIC | Age: 20
End: 2025-08-14
Payer: MEDICAID

## 2025-08-14 VITALS
SYSTOLIC BLOOD PRESSURE: 119 MMHG | WEIGHT: 173 LBS | BODY MASS INDEX: 30.65 KG/M2 | DIASTOLIC BLOOD PRESSURE: 76 MMHG | HEIGHT: 63 IN

## 2025-08-14 DIAGNOSIS — Z01.419 WELL WOMAN EXAM WITH ROUTINE GYNECOLOGICAL EXAM: Primary | ICD-10-CM

## 2025-08-14 PROCEDURE — 1036F TOBACCO NON-USER: CPT | Performed by: OBSTETRICS & GYNECOLOGY

## 2025-08-14 PROCEDURE — 3008F BODY MASS INDEX DOCD: CPT | Performed by: OBSTETRICS & GYNECOLOGY

## 2025-08-14 PROCEDURE — 99395 PREV VISIT EST AGE 18-39: CPT | Performed by: OBSTETRICS & GYNECOLOGY

## 2025-09-03 ENCOUNTER — OFFICE VISIT (OUTPATIENT)
Dept: URGENT CARE | Age: 20
End: 2025-09-03
Payer: MEDICAID

## 2025-09-03 VITALS
WEIGHT: 173 LBS | SYSTOLIC BLOOD PRESSURE: 134 MMHG | HEART RATE: 82 BPM | DIASTOLIC BLOOD PRESSURE: 82 MMHG | RESPIRATION RATE: 18 BRPM | BODY MASS INDEX: 30.65 KG/M2 | TEMPERATURE: 98.8 F | OXYGEN SATURATION: 99 %

## 2025-09-03 DIAGNOSIS — Z86.69 HX OF MIGRAINES: Primary | ICD-10-CM

## 2025-09-03 DIAGNOSIS — H61.22 LEFT EAR IMPACTED CERUMEN: ICD-10-CM

## 2025-09-03 RX ORDER — ASPIRIN 81 MG
5 TABLET, DELAYED RELEASE (ENTERIC COATED) ORAL 2 TIMES DAILY
Qty: 15 ML | Refills: 0 | Status: SHIPPED | OUTPATIENT
Start: 2025-09-03 | End: 2025-09-06

## 2025-09-03 RX ORDER — ONDANSETRON 4 MG/1
4 TABLET, ORALLY DISINTEGRATING ORAL EVERY 8 HOURS PRN
Qty: 9 TABLET | Refills: 0 | Status: SHIPPED | OUTPATIENT
Start: 2025-09-03

## 2025-09-30 ENCOUNTER — APPOINTMENT (OUTPATIENT)
Dept: OBSTETRICS AND GYNECOLOGY | Facility: CLINIC | Age: 20
End: 2025-09-30
Payer: MEDICAID

## 2026-01-08 ENCOUNTER — APPOINTMENT (OUTPATIENT)
Dept: PEDIATRIC PULMONOLOGY | Facility: CLINIC | Age: 21
End: 2026-01-08
Payer: MEDICAID

## 2026-08-17 ENCOUNTER — APPOINTMENT (OUTPATIENT)
Dept: PRIMARY CARE | Facility: CLINIC | Age: 21
End: 2026-08-17
Payer: MEDICAID

## (undated) DEVICE — SYRINGE, 10 CC, LUER LOCK

## (undated) DEVICE — PREP TRAY, VAGINAL, SKIN SCRUB

## (undated) DEVICE — GLOVE, SURGICAL, PROTEXIS PI , 6.5, PF, LF

## (undated) DEVICE — Device

## (undated) DEVICE — PREP, SCRUB, SKIN, FOAM, HIBICLENS, 4 OZ

## (undated) DEVICE — SUTURE, VICRYL, 3-0, 27 IN, SH

## (undated) DEVICE — CONTAINER STERILE SPECIMEN 90ML, STERILE